# Patient Record
Sex: FEMALE | Race: ASIAN
[De-identification: names, ages, dates, MRNs, and addresses within clinical notes are randomized per-mention and may not be internally consistent; named-entity substitution may affect disease eponyms.]

---

## 2020-06-25 NOTE — PROCEDURE NOTE: CLINICAL
PROCEDURE NOTE: Epilation #3 Right Upper Lid. Diagnosis: Trichiasis without Entropion. Anesthesia: Topical. Prior to treatment, the risks/benefits/alternatives were discussed. The patient wished to proceed with procedure. Aberrant lashes removed from RUL using microforcep. Patient tolerated procedure well. There were no complications. Post-op instructions given. Angeline Ruiz

## 2020-06-30 NOTE — PROCEDURE NOTE: CLINICAL
PROCEDURE NOTE: Punctal Plugs, Quintess Dissolvable 0.4 mm (89286F, ) #1 Left Lower Lid. Prior to treatment, the risks/benefits/alternatives were discussed. The patient wished to proceed with procedure. Temporary collagen plugs were inserted. Patient tolerated procedure well. There were no complications. Post procedure instructions given. Zoie Light PROCEDURE NOTE: Punctal Plugs, Quintess Dissolvable 0.4 mm ([id number], ) #1 Right Lower Lid. Prior to treatment, the risks/benefits/alternatives were discussed. The patient wished to proceed with procedure. Temporary collagen plugs were inserted. Patient tolerated procedure well. There were no complications. Post procedure instructions given. Zoie Light PROCEDURE NOTE: Removal of Conjunctival FB, Superficial #1 Left Upper Lid. Anesthesia: Topical. Prior to treatment, the risks/benefits/alternatives were discussed. The patient wished to proceed with procedure. Superficial conjunctival FB removed with forcep/needle. Globe and conjunctiva intact. Patient tolerated procedure well. There were no complications. Post procedure instructions given. Zoie Light

## 2020-06-30 NOTE — PROCEDURE NOTE: CLINICAL
PROCEDURE NOTE: Punctal Plugs, Quintess Dissolvable 0.4 mm ([id number], ) #1 Left Lower Lid. Prior to treatment, the risks/benefits/alternatives were discussed. The patient wished to proceed with procedure. Temporary collagen plugs were inserted. Patient tolerated procedure well. There were no complications. Post procedure instructions given. Zoie Light PROCEDURE NOTE: Punctal Plugs, Quintess Dissolvable 0.4 mm ([id number], ) #1 Right Lower Lid. Prior to treatment, the risks/benefits/alternatives were discussed. The patient wished to proceed with procedure. Temporary collagen plugs were inserted. Patient tolerated procedure well. There were no complications. Post procedure instructions given. Zoie Light PROCEDURE NOTE: Removal of Conjunctival FB, Superficial #1 Left Upper Lid. Anesthesia: Topical. Prior to treatment, the risks/benefits/alternatives were discussed. The patient wished to proceed with procedure. Superficial conjunctival FB removed with forcep/needle. Globe and conjunctiva intact. Patient tolerated procedure well. There were no complications. Post procedure instructions given. Zoie Light

## 2021-07-26 ENCOUNTER — PREPPED CHART (OUTPATIENT)
Dept: URBAN - METROPOLITAN AREA CLINIC 48 | Facility: CLINIC | Age: 51
End: 2021-07-26

## 2021-07-28 ENCOUNTER — NEW PATIENT COMPREHENSIVE (OUTPATIENT)
Dept: URBAN - METROPOLITAN AREA CLINIC 48 | Facility: CLINIC | Age: 51
End: 2021-07-28

## 2021-07-28 DIAGNOSIS — H52.4: ICD-10-CM

## 2021-07-28 DIAGNOSIS — H25.13: ICD-10-CM

## 2021-07-28 DIAGNOSIS — H40.013: ICD-10-CM

## 2021-07-28 DIAGNOSIS — H43.813: ICD-10-CM

## 2021-07-28 PROCEDURE — 92004 COMPRE OPH EXAM NEW PT 1/>: CPT

## 2021-07-28 PROCEDURE — 92015 DETERMINE REFRACTIVE STATE: CPT

## 2021-07-28 PROCEDURE — 92134 CPTRZ OPH DX IMG PST SGM RTA: CPT

## 2021-07-28 ASSESSMENT — TONOMETRY
OD_IOP_MMHG: 17
OS_IOP_MMHG: 18

## 2021-07-28 ASSESSMENT — KERATOMETRY
OS_AXISANGLE2_DEGREES: 10
OD_K2POWER_DIOPTERS: 41.75
OS_K2POWER_DIOPTERS: 42.00
OD_AXISANGLE2_DEGREES: 160
OD_AXISANGLE_DEGREES: 070
OS_AXISANGLE_DEGREES: 100
OD_K1POWER_DIOPTERS: 42.75
OS_K1POWER_DIOPTERS: 42.75

## 2021-07-28 ASSESSMENT — VISUAL ACUITY
OD_SC: 20/80
OS_SC: 20/40
OU_CC: J2

## 2021-07-28 NOTE — PATIENT DISCUSSION
Per mod CDs OU. IOP 17/18. No family hx noted per patient. OCT RNFL in office today shows all quads wnl OU. Will continue to monitor.